# Patient Record
Sex: MALE | Race: ASIAN | NOT HISPANIC OR LATINO
[De-identification: names, ages, dates, MRNs, and addresses within clinical notes are randomized per-mention and may not be internally consistent; named-entity substitution may affect disease eponyms.]

---

## 2021-02-03 ENCOUNTER — TRANSCRIPTION ENCOUNTER (OUTPATIENT)
Age: 61
End: 2021-02-03

## 2021-02-04 PROBLEM — Z00.00 ENCOUNTER FOR PREVENTIVE HEALTH EXAMINATION: Status: ACTIVE | Noted: 2021-02-04

## 2021-02-06 ENCOUNTER — OUTPATIENT (OUTPATIENT)
Dept: OUTPATIENT SERVICES | Facility: HOSPITAL | Age: 61
LOS: 1 days | End: 2021-02-06
Payer: COMMERCIAL

## 2021-02-06 ENCOUNTER — APPOINTMENT (OUTPATIENT)
Dept: DISASTER EMERGENCY | Facility: HOSPITAL | Age: 61
End: 2021-02-06

## 2021-02-06 VITALS
TEMPERATURE: 98 F | SYSTOLIC BLOOD PRESSURE: 138 MMHG | OXYGEN SATURATION: 100 % | RESPIRATION RATE: 18 BRPM | DIASTOLIC BLOOD PRESSURE: 84 MMHG | HEIGHT: 75 IN | WEIGHT: 195.99 LBS | HEART RATE: 90 BPM

## 2021-02-06 VITALS
TEMPERATURE: 98 F | DIASTOLIC BLOOD PRESSURE: 77 MMHG | HEART RATE: 78 BPM | RESPIRATION RATE: 18 BRPM | OXYGEN SATURATION: 100 % | SYSTOLIC BLOOD PRESSURE: 120 MMHG

## 2021-02-06 DIAGNOSIS — U07.1 COVID-19: ICD-10-CM

## 2021-02-06 PROCEDURE — M0239: CPT

## 2021-02-06 RX ORDER — SODIUM CHLORIDE 9 MG/ML
200 INJECTION INTRAMUSCULAR; INTRAVENOUS; SUBCUTANEOUS
Refills: 0 | Status: DISCONTINUED | OUTPATIENT
Start: 2021-02-06 | End: 2021-02-20

## 2021-02-06 RX ORDER — BAMLANIVIMAB 35 MG/ML
700 INJECTION, SOLUTION INTRAVENOUS ONCE
Refills: 0 | Status: COMPLETED | OUTPATIENT
Start: 2021-02-06 | End: 2021-02-06

## 2021-02-06 RX ORDER — SODIUM CHLORIDE 9 MG/ML
250 INJECTION INTRAMUSCULAR; INTRAVENOUS; SUBCUTANEOUS
Refills: 0 | Status: DISCONTINUED | OUTPATIENT
Start: 2021-02-06 | End: 2021-02-20

## 2021-02-06 RX ADMIN — SODIUM CHLORIDE 25 MILLILITER(S): 9 INJECTION INTRAMUSCULAR; INTRAVENOUS; SUBCUTANEOUS at 11:54

## 2021-02-06 RX ADMIN — BAMLANIVIMAB 270 MILLIGRAM(S): 35 INJECTION, SOLUTION INTRAVENOUS at 10:52

## 2021-02-06 RX ADMIN — SODIUM CHLORIDE 200 MILLILITER(S): 9 INJECTION INTRAMUSCULAR; INTRAVENOUS; SUBCUTANEOUS at 10:44

## 2021-02-06 NOTE — CHART NOTE - NSCHARTNOTEFT_GEN_A_CORE
CC: Monoclonal Antibody Infusion/COVID 19 Positive  60yMale history DM reports 5 day symptoms fever, headache, cough, body aches has COVID + PCR on 2/3 referred for [facility] infusion    exam/findings:  T(C): 36.3 (02-06-21 @ 10:52), Max: 36.4 (02-06-21 @ 10:18)  HR: 71 (02-06-21 @ 10:52) (71 - 90)  BP: 101/67 (02-06-21 @ 10:52) (101/67 - 138/84)  RR: 16 (02-06-21 @ 10:52) (16 - 18)  SpO2: 99% (02-06-21 @ 10:52) (99% - 100%)      PE:   Appearance: NAD	  HEENT:   Normal oral mucosa,   Lymphatic: No lymphadenopathy  Cardiovascular: Normal S1 S2, No JVD, No murmurs, No edema  Respiratory: Lungs clear to auscultation  + cough	  Gastrointestinal:  Soft, Non-tender, + BS	  Skin: warm and dry  Neurologic: Non-focal  Extremities: Normal range of motion,    ASSESSMENT:  Pt is a   60 year old male  Covid +  referred by PCP who presents to infusion center for Monoclonal antibody infusion (Bamlanivimab)  Symptoms/ Criteria: fever, headache, body aches, diarrhea, cough  Risk Profile includes: age > 55 years, DM    PLAN:  - infusion procedure explained to patient   -Consent for monoclonal antibody infusion obtained   - Risk & benefits discussed/all questions answered  -infuse  Bamlanivimab 700mg  IV over one hour   -observe patient for one hour post infusion    Post infusion   Patient tolerated infusion well, denies complaints of chest pain,SOB,dizziness,palpitations  Vital signs remain stable for discharge home  D/C instructions given / fact sheet reviewed and included with discharge paperwork  Pt verbalizes to seek medical attention if needed  To follow with PMD w/in 1 week

## 2021-02-06 NOTE — CHART NOTE - NSCHARTNOTEFT_GEN_A_CORE
I have reviewed the Bamlanivimab Emergency Use Authorization (EAU) and I have provided the patient or patient's caregiver with the following information:  1. FDA has authorized emergency use of Bamlanivimab, which is not FDA-approved biologic product.  2. The patient or patient's caregiver has the option to accept or refuse administration of Bamlanivimab.  3. The significant known and benefits are unknown.  4. Information on available alternative treatments and risks and benefits of those alternatives.    Discharge:  T(C): 36.6 (02-06-21 @ 12:52), Max: 36.6 (02-06-21 @ 12:52)  HR: 78 (02-06-21 @ 12:52) (71 - 90)  BP: 120/77 (02-06-21 @ 12:52) (101/67 - 138/84)  RR: 18 (02-06-21 @ 12:52) (16 - 20)  SpO2: 100% (02-06-21 @ 12:52) (99% - 100%)  Patient tolerated infusion well denies complaints of chest pain/SOB/dizzines/ palps  VSS for discharge home  D/C instructions given/ fact sheet included.  Patient to follow-up with PCP as needed.

## 2021-02-07 ENCOUNTER — TRANSCRIPTION ENCOUNTER (OUTPATIENT)
Age: 61
End: 2021-02-07

## 2024-05-08 ENCOUNTER — APPOINTMENT (OUTPATIENT)
Dept: GASTROENTEROLOGY | Facility: CLINIC | Age: 64
End: 2024-05-08